# Patient Record
Sex: FEMALE | Race: WHITE | NOT HISPANIC OR LATINO | Employment: OTHER | URBAN - METROPOLITAN AREA
[De-identification: names, ages, dates, MRNs, and addresses within clinical notes are randomized per-mention and may not be internally consistent; named-entity substitution may affect disease eponyms.]

---

## 2023-03-17 PROBLEM — J20.9 ACUTE BRONCHITIS: Status: ACTIVE | Noted: 2023-03-17

## 2023-03-17 PROBLEM — I10 HYPERTENSION: Status: ACTIVE | Noted: 2023-03-17

## 2023-03-17 PROBLEM — T78.40XA ACUTE ALLERGIC REACTION: Status: ACTIVE | Noted: 2023-03-17

## 2023-03-17 PROBLEM — L03.319 CELLULITIS OF TRUNK: Status: ACTIVE | Noted: 2023-03-17

## 2023-03-17 PROBLEM — R93.1 ELEVATED CORONARY ARTERY CALCIUM SCORE: Status: ACTIVE | Noted: 2023-03-17

## 2023-03-17 PROBLEM — J30.9 ALLERGIC RHINITIS: Status: ACTIVE | Noted: 2023-03-17

## 2023-03-17 PROBLEM — L30.9 ECZEMA: Status: ACTIVE | Noted: 2023-03-17

## 2023-03-17 PROBLEM — J34.2 NASAL SEPTAL DEVIATION: Status: ACTIVE | Noted: 2023-03-17

## 2023-03-17 PROBLEM — T70.20XA: Status: ACTIVE | Noted: 2023-03-17

## 2023-03-17 PROBLEM — J32.0 CHRONIC MAXILLARY SINUSITIS: Status: ACTIVE | Noted: 2023-03-17

## 2023-03-17 PROBLEM — E78.5 HYPERLIPIDEMIA: Status: ACTIVE | Noted: 2023-03-17

## 2023-03-17 RX ORDER — ACETAMINOPHEN 160 MG/5ML
SUSPENSION, ORAL (FINAL DOSE FORM) ORAL
COMMUNITY
Start: 2021-06-07 | End: 2023-03-28

## 2023-03-17 RX ORDER — ACETAZOLAMIDE 125 MG/1
1 TABLET ORAL 2 TIMES DAILY
COMMUNITY
Start: 2022-03-22 | End: 2023-03-28

## 2023-03-17 RX ORDER — FLUTICASONE PROPIONATE AND SALMETEROL 100; 50 UG/1; UG/1
1 POWDER RESPIRATORY (INHALATION) 2 TIMES DAILY
COMMUNITY
Start: 2022-03-22 | End: 2023-03-28

## 2023-03-17 RX ORDER — VIT C/E/ZN/COPPR/LUTEIN/ZEAXAN 250MG-90MG
3 CAPSULE ORAL DAILY
COMMUNITY
End: 2024-04-12

## 2023-03-17 RX ORDER — PROPRANOLOL HYDROCHLORIDE 10 MG/1
TABLET ORAL 2 TIMES DAILY
COMMUNITY
Start: 2019-07-25 | End: 2023-03-28 | Stop reason: SDUPTHER

## 2023-03-28 ENCOUNTER — OFFICE VISIT (OUTPATIENT)
Dept: PRIMARY CARE | Facility: CLINIC | Age: 68
End: 2023-03-28
Payer: MEDICARE

## 2023-03-28 ENCOUNTER — LAB (OUTPATIENT)
Dept: LAB | Facility: LAB | Age: 68
End: 2023-03-28
Payer: MEDICARE

## 2023-03-28 VITALS
SYSTOLIC BLOOD PRESSURE: 122 MMHG | HEART RATE: 63 BPM | HEIGHT: 64 IN | DIASTOLIC BLOOD PRESSURE: 74 MMHG | OXYGEN SATURATION: 99 % | WEIGHT: 136 LBS | BODY MASS INDEX: 23.22 KG/M2

## 2023-03-28 DIAGNOSIS — I10 PRIMARY HYPERTENSION: ICD-10-CM

## 2023-03-28 DIAGNOSIS — Z00.00 ANNUAL PHYSICAL EXAM: Primary | ICD-10-CM

## 2023-03-28 DIAGNOSIS — E78.5 HYPERLIPIDEMIA, UNSPECIFIED HYPERLIPIDEMIA TYPE: ICD-10-CM

## 2023-03-28 DIAGNOSIS — Z00.00 ANNUAL PHYSICAL EXAM: ICD-10-CM

## 2023-03-28 LAB
ALANINE AMINOTRANSFERASE (SGPT) (U/L) IN SER/PLAS: 16 U/L (ref 7–45)
ALBUMIN (G/DL) IN SER/PLAS: 4.4 G/DL (ref 3.4–5)
ALKALINE PHOSPHATASE (U/L) IN SER/PLAS: 71 U/L (ref 33–136)
ANION GAP IN SER/PLAS: 13 MMOL/L (ref 10–20)
ASPARTATE AMINOTRANSFERASE (SGOT) (U/L) IN SER/PLAS: 20 U/L (ref 9–39)
BILIRUBIN TOTAL (MG/DL) IN SER/PLAS: 0.4 MG/DL (ref 0–1.2)
CALCIUM (MG/DL) IN SER/PLAS: 9.6 MG/DL (ref 8.6–10.6)
CARBON DIOXIDE, TOTAL (MMOL/L) IN SER/PLAS: 28 MMOL/L (ref 21–32)
CHLORIDE (MMOL/L) IN SER/PLAS: 101 MMOL/L (ref 98–107)
CHOLESTEROL (MG/DL) IN SER/PLAS: 319 MG/DL (ref 0–199)
CHOLESTEROL IN HDL (MG/DL) IN SER/PLAS: 58.2 MG/DL
CHOLESTEROL/HDL RATIO: 5.5
CREATININE (MG/DL) IN SER/PLAS: 0.84 MG/DL (ref 0.5–1.05)
ERYTHROCYTE DISTRIBUTION WIDTH (RATIO) BY AUTOMATED COUNT: 13.5 % (ref 11.5–14.5)
ERYTHROCYTE MEAN CORPUSCULAR HEMOGLOBIN CONCENTRATION (G/DL) BY AUTOMATED: 30.8 G/DL (ref 32–36)
ERYTHROCYTE MEAN CORPUSCULAR VOLUME (FL) BY AUTOMATED COUNT: 92 FL (ref 80–100)
ERYTHROCYTES (10*6/UL) IN BLOOD BY AUTOMATED COUNT: 4.35 X10E12/L (ref 4–5.2)
GFR FEMALE: 75 ML/MIN/1.73M2
GLUCOSE (MG/DL) IN SER/PLAS: 109 MG/DL (ref 74–99)
HEMATOCRIT (%) IN BLOOD BY AUTOMATED COUNT: 40 % (ref 36–46)
HEMOGLOBIN (G/DL) IN BLOOD: 12.3 G/DL (ref 12–16)
LDL: 237 MG/DL (ref 0–99)
LEUKOCYTES (10*3/UL) IN BLOOD BY AUTOMATED COUNT: 4.7 X10E9/L (ref 4.4–11.3)
NRBC (PER 100 WBCS) BY AUTOMATED COUNT: 0 /100 WBC (ref 0–0)
PLATELETS (10*3/UL) IN BLOOD AUTOMATED COUNT: 268 X10E9/L (ref 150–450)
POTASSIUM (MMOL/L) IN SER/PLAS: 4.3 MMOL/L (ref 3.5–5.3)
PROTEIN TOTAL: 6.7 G/DL (ref 6.4–8.2)
SODIUM (MMOL/L) IN SER/PLAS: 138 MMOL/L (ref 136–145)
TRIGLYCERIDE (MG/DL) IN SER/PLAS: 121 MG/DL (ref 0–149)
UREA NITROGEN (MG/DL) IN SER/PLAS: 12 MG/DL (ref 6–23)
VLDL: 24 MG/DL (ref 0–40)

## 2023-03-28 PROCEDURE — 3078F DIAST BP <80 MM HG: CPT | Performed by: FAMILY MEDICINE

## 2023-03-28 PROCEDURE — 85027 COMPLETE CBC AUTOMATED: CPT

## 2023-03-28 PROCEDURE — 1160F RVW MEDS BY RX/DR IN RCRD: CPT | Performed by: FAMILY MEDICINE

## 2023-03-28 PROCEDURE — G0439 PPPS, SUBSEQ VISIT: HCPCS | Performed by: FAMILY MEDICINE

## 2023-03-28 PROCEDURE — 80061 LIPID PANEL: CPT

## 2023-03-28 PROCEDURE — 1036F TOBACCO NON-USER: CPT | Performed by: FAMILY MEDICINE

## 2023-03-28 PROCEDURE — 36415 COLL VENOUS BLD VENIPUNCTURE: CPT

## 2023-03-28 PROCEDURE — 3074F SYST BP LT 130 MM HG: CPT | Performed by: FAMILY MEDICINE

## 2023-03-28 PROCEDURE — 1170F FXNL STATUS ASSESSED: CPT | Performed by: FAMILY MEDICINE

## 2023-03-28 PROCEDURE — 80053 COMPREHEN METABOLIC PANEL: CPT

## 2023-03-28 PROCEDURE — 1159F MED LIST DOCD IN RCRD: CPT | Performed by: FAMILY MEDICINE

## 2023-03-28 RX ORDER — PROPRANOLOL HYDROCHLORIDE 10 MG/1
30 TABLET ORAL DAILY
Qty: 270 TABLET | Refills: 3 | Status: SHIPPED | OUTPATIENT
Start: 2023-03-28 | End: 2024-03-27 | Stop reason: SDUPTHER

## 2023-03-28 ASSESSMENT — PAIN SCALES - GENERAL: PAINLEVEL: 0-NO PAIN

## 2023-03-28 ASSESSMENT — ENCOUNTER SYMPTOMS
OCCASIONAL FEELINGS OF UNSTEADINESS: 0
LOSS OF SENSATION IN FEET: 0
DEPRESSION: 0

## 2023-03-28 ASSESSMENT — ACTIVITIES OF DAILY LIVING (ADL)
DRESSING: INDEPENDENT
GROCERY_SHOPPING: INDEPENDENT
DOING_HOUSEWORK: INDEPENDENT
TAKING_MEDICATION: INDEPENDENT
MANAGING_FINANCES: INDEPENDENT
BATHING: INDEPENDENT

## 2023-03-28 NOTE — PROGRESS NOTES
"Subjective   Patient ID: Jaqueline Pastrana is a 67 y.o. female who presents for Medicare Annual Wellness Visit Subsequent (Medicare annual physical exam. Pt is fasting. ).    HPI   Just got over food poisoning. Otherwise doing well.    Review of Systems  Cardiovascular: no chest pain, no edema, no palpitations, and no syncope.   Respiratory: no cough,no shortness of breath, and no wheezing  Gastrointestinal: no abdominal pain, nausea, vomiting or blood in stools  Genitourinary: no dysuria or hematuria  Objective   /74 (BP Location: Left arm, Patient Position: Sitting, BP Cuff Size: Adult)   Pulse 63   Ht 1.626 m (5' 4\")   Wt 61.7 kg (136 lb)   SpO2 99%   BMI 23.34 kg/m²     Physical Exam  Alert, pleasant and in no acute distress.  Heart: Regular rate and rhythm without murmur  Lungs: Clear to auscultation  Abdomen: Soft, nontender and without mass  Lower extremities: No edema  Assessment/Plan   Problem List Items Addressed This Visit          Circulatory    Hypertension    Relevant Medications    propranolol (Inderal) 10 mg tablet    Other Relevant Orders    Comprehensive Metabolic Panel (Completed)    CBC (Completed)    Lipid Panel (Completed)       Other    Hyperlipidemia    Relevant Orders    Comprehensive Metabolic Panel (Completed)    CBC (Completed)    Lipid Panel (Completed)     Other Visit Diagnoses       Annual physical exam    -  Primary    Relevant Orders    Comprehensive Metabolic Panel (Completed)    CBC (Completed)    Lipid Panel (Completed)        Patient here for annual checkup.  She feels she is doing better than she has in a long time.  Her allergies and eczema are under very good control.  Her diet which was very healthy she has worked on improving even further.  Routine labs obtained.  We will call in 1 week with results.  Colonoscopy and mammogram up-to-date  Blood pressure under good control.  We will continue propranolol  Follow up in 6 mos       "

## 2023-04-18 DIAGNOSIS — R10.9 ABDOMINAL CRAMPS: Primary | ICD-10-CM

## 2023-04-18 RX ORDER — DICYCLOMINE HYDROCHLORIDE 10 MG/1
10 CAPSULE ORAL 4 TIMES DAILY PRN
Qty: 45 CAPSULE | Refills: 2 | Status: SHIPPED | OUTPATIENT
Start: 2023-04-18 | End: 2023-07-17

## 2024-03-26 DIAGNOSIS — I10 PRIMARY HYPERTENSION: ICD-10-CM

## 2024-03-26 NOTE — TELEPHONE ENCOUNTER
Pt was in last march & needed refill on propranolol 10 mg 3 daily to [facility]  [phone].  Made apt in may for annual  Allergies to amlodipine,atorvastatin,codeine,lactose,pseudoephedrine,rosuvastatin,statins  Scallops,sulfa,wheat  Ty

## 2024-03-27 ENCOUNTER — NURSE TRIAGE (OUTPATIENT)
Dept: PRIMARY CARE | Facility: CLINIC | Age: 69
End: 2024-03-27
Payer: MEDICARE

## 2024-03-27 RX ORDER — PROPRANOLOL HYDROCHLORIDE 10 MG/1
30 TABLET ORAL DAILY
Qty: 270 TABLET | Refills: 3 | Status: SHIPPED | OUTPATIENT
Start: 2024-03-27 | End: 2025-03-27

## 2024-04-12 ENCOUNTER — HOSPITAL ENCOUNTER (OUTPATIENT)
Dept: RADIOLOGY | Facility: CLINIC | Age: 69
Discharge: HOME | End: 2024-04-12
Payer: MEDICARE

## 2024-04-12 ENCOUNTER — OFFICE VISIT (OUTPATIENT)
Dept: PRIMARY CARE | Facility: CLINIC | Age: 69
End: 2024-04-12
Payer: MEDICARE

## 2024-04-12 VITALS — HEIGHT: 63 IN | BODY MASS INDEX: 24.53 KG/M2 | WEIGHT: 138.45 LBS

## 2024-04-12 VITALS
DIASTOLIC BLOOD PRESSURE: 74 MMHG | BODY MASS INDEX: 24.52 KG/M2 | OXYGEN SATURATION: 97 % | HEIGHT: 63 IN | SYSTOLIC BLOOD PRESSURE: 128 MMHG | TEMPERATURE: 97.5 F | WEIGHT: 138.4 LBS | HEART RATE: 82 BPM

## 2024-04-12 DIAGNOSIS — Z11.59 ENCOUNTER FOR HEPATITIS C SCREENING TEST FOR LOW RISK PATIENT: ICD-10-CM

## 2024-04-12 DIAGNOSIS — Z12.31 BREAST CANCER SCREENING BY MAMMOGRAM: ICD-10-CM

## 2024-04-12 DIAGNOSIS — G47.00 INSOMNIA, UNSPECIFIED TYPE: ICD-10-CM

## 2024-04-12 DIAGNOSIS — E78.5 HYPERLIPIDEMIA, UNSPECIFIED HYPERLIPIDEMIA TYPE: ICD-10-CM

## 2024-04-12 DIAGNOSIS — Z00.00 ANNUAL PHYSICAL EXAM: Primary | ICD-10-CM

## 2024-04-12 DIAGNOSIS — Z12.11 COLON CANCER SCREENING: ICD-10-CM

## 2024-04-12 DIAGNOSIS — I10 PRIMARY HYPERTENSION: ICD-10-CM

## 2024-04-12 LAB
ALBUMIN SERPL BCP-MCNC: 4.4 G/DL (ref 3.4–5)
ALP SERPL-CCNC: 68 U/L (ref 33–136)
ALT SERPL W P-5'-P-CCNC: 31 U/L (ref 7–45)
ANION GAP SERPL CALC-SCNC: 12 MMOL/L (ref 10–20)
AST SERPL W P-5'-P-CCNC: 34 U/L (ref 9–39)
BILIRUB SERPL-MCNC: 0.4 MG/DL (ref 0–1.2)
BUN SERPL-MCNC: 13 MG/DL (ref 6–23)
CALCIUM SERPL-MCNC: 9.8 MG/DL (ref 8.6–10.6)
CHLORIDE SERPL-SCNC: 101 MMOL/L (ref 98–107)
CHOLEST SERPL-MCNC: 291 MG/DL (ref 0–199)
CHOLESTEROL/HDL RATIO: 5
CO2 SERPL-SCNC: 29 MMOL/L (ref 21–32)
CREAT SERPL-MCNC: 0.8 MG/DL (ref 0.5–1.05)
EGFRCR SERPLBLD CKD-EPI 2021: 79 ML/MIN/1.73M*2
ERYTHROCYTE [DISTWIDTH] IN BLOOD BY AUTOMATED COUNT: 15.5 % (ref 11.5–14.5)
GLUCOSE SERPL-MCNC: 98 MG/DL (ref 74–99)
HCT VFR BLD AUTO: 45.1 % (ref 36–46)
HCV AB SER QL: NONREACTIVE
HDLC SERPL-MCNC: 58.3 MG/DL
HGB BLD-MCNC: 14.1 G/DL (ref 12–16)
LDLC SERPL CALC-MCNC: 197 MG/DL
MCH RBC QN AUTO: 29 PG (ref 26–34)
MCHC RBC AUTO-ENTMCNC: 31.3 G/DL (ref 32–36)
MCV RBC AUTO: 93 FL (ref 80–100)
NON HDL CHOLESTEROL: 233 MG/DL (ref 0–149)
NRBC BLD-RTO: 0 /100 WBCS (ref 0–0)
PLATELET # BLD AUTO: 265 X10*3/UL (ref 150–450)
POTASSIUM SERPL-SCNC: 4.8 MMOL/L (ref 3.5–5.3)
PROT SERPL-MCNC: 7.2 G/DL (ref 6.4–8.2)
RBC # BLD AUTO: 4.86 X10*6/UL (ref 4–5.2)
SODIUM SERPL-SCNC: 137 MMOL/L (ref 136–145)
TRIGL SERPL-MCNC: 178 MG/DL (ref 0–149)
VLDL: 36 MG/DL (ref 0–40)
WBC # BLD AUTO: 4.8 X10*3/UL (ref 4.4–11.3)

## 2024-04-12 PROCEDURE — 1036F TOBACCO NON-USER: CPT | Performed by: FAMILY MEDICINE

## 2024-04-12 PROCEDURE — 80053 COMPREHEN METABOLIC PANEL: CPT

## 2024-04-12 PROCEDURE — 3074F SYST BP LT 130 MM HG: CPT | Performed by: FAMILY MEDICINE

## 2024-04-12 PROCEDURE — G0439 PPPS, SUBSEQ VISIT: HCPCS | Performed by: FAMILY MEDICINE

## 2024-04-12 PROCEDURE — 1157F ADVNC CARE PLAN IN RCRD: CPT | Performed by: FAMILY MEDICINE

## 2024-04-12 PROCEDURE — 77067 SCR MAMMO BI INCL CAD: CPT

## 2024-04-12 PROCEDURE — 80061 LIPID PANEL: CPT

## 2024-04-12 PROCEDURE — 1126F AMNT PAIN NOTED NONE PRSNT: CPT | Performed by: FAMILY MEDICINE

## 2024-04-12 PROCEDURE — 77063 BREAST TOMOSYNTHESIS BI: CPT | Performed by: STUDENT IN AN ORGANIZED HEALTH CARE EDUCATION/TRAINING PROGRAM

## 2024-04-12 PROCEDURE — 99213 OFFICE O/P EST LOW 20 MIN: CPT | Performed by: FAMILY MEDICINE

## 2024-04-12 PROCEDURE — 1160F RVW MEDS BY RX/DR IN RCRD: CPT | Performed by: FAMILY MEDICINE

## 2024-04-12 PROCEDURE — 1159F MED LIST DOCD IN RCRD: CPT | Performed by: FAMILY MEDICINE

## 2024-04-12 PROCEDURE — 3078F DIAST BP <80 MM HG: CPT | Performed by: FAMILY MEDICINE

## 2024-04-12 PROCEDURE — 1170F FXNL STATUS ASSESSED: CPT | Performed by: FAMILY MEDICINE

## 2024-04-12 PROCEDURE — 77067 SCR MAMMO BI INCL CAD: CPT | Performed by: STUDENT IN AN ORGANIZED HEALTH CARE EDUCATION/TRAINING PROGRAM

## 2024-04-12 PROCEDURE — 36415 COLL VENOUS BLD VENIPUNCTURE: CPT

## 2024-04-12 PROCEDURE — 85027 COMPLETE CBC AUTOMATED: CPT

## 2024-04-12 PROCEDURE — 86803 HEPATITIS C AB TEST: CPT

## 2024-04-12 RX ORDER — ZOLPIDEM TARTRATE 5 MG/1
2.5-5 TABLET ORAL NIGHTLY PRN
Qty: 30 TABLET | Refills: 0 | Status: SHIPPED | OUTPATIENT
Start: 2024-04-12 | End: 2024-06-11

## 2024-04-12 ASSESSMENT — ENCOUNTER SYMPTOMS
DEPRESSION: 0
LOSS OF SENSATION IN FEET: 0
OCCASIONAL FEELINGS OF UNSTEADINESS: 0

## 2024-04-12 ASSESSMENT — PAIN SCALES - GENERAL: PAINLEVEL: 0-NO PAIN

## 2024-04-12 ASSESSMENT — PATIENT HEALTH QUESTIONNAIRE - PHQ9
2. FEELING DOWN, DEPRESSED OR HOPELESS: NOT AT ALL
1. LITTLE INTEREST OR PLEASURE IN DOING THINGS: NOT AT ALL
SUM OF ALL RESPONSES TO PHQ9 QUESTIONS 1 AND 2: 0

## 2024-04-12 ASSESSMENT — ACTIVITIES OF DAILY LIVING (ADL)
BATHING: INDEPENDENT
MANAGING_FINANCES: INDEPENDENT
GROCERY_SHOPPING: INDEPENDENT
TAKING_MEDICATION: INDEPENDENT
DRESSING: INDEPENDENT
DOING_HOUSEWORK: INDEPENDENT

## 2024-04-12 NOTE — PROGRESS NOTES
"Subjective   Patient ID: Gabriela Pastrana is a 68 y.o. female who presents for Medicare Annual Wellness Visit Subsequent (Medicare annual wellness exam.) and Annual Exam (Annual physical exam, pt is fasting.).    HPI   Patient here for Medicare wellness  She is doing well.  She exercises regularly and eats a very healthy clean vegetarian-based diet.  Her chronic eczema has improved.  Main complaint today is insomnia.  Patient states that once or twice a week she just cannot fall asleep.  She has tried various meditative type activities as well as reading and still struggles.  Review of Systems    Objective   /74 (BP Location: Left arm, Patient Position: Sitting, BP Cuff Size: Adult)   Pulse 82   Temp 36.4 °C (97.5 °F) (Temporal)   Ht 1.604 m (5' 3.15\")   Wt 62.8 kg (138 lb 6.4 oz)   SpO2 97%   BMI 24.40 kg/m²     Physical Exam  Alert, pleasant and in no acute distress.  Neck: Supple, no JVD or carotid bruit  Heart: Regular rate and rhythm, no murmur  Lungs: Clear to auscultation  Lower extremities: No edema  Abdomen: Soft, nontender without palpable mass    Assessment/Plan   Problem List Items Addressed This Visit             ICD-10-CM    Hyperlipidemia E78.5    Relevant Orders    Comprehensive Metabolic Panel    CBC    Lipid Panel    Hypertension I10    Relevant Orders    Comprehensive Metabolic Panel    CBC    Lipid Panel     Other Visit Diagnoses         Codes    Annual physical exam    -  Primary Z00.00    Relevant Orders    Comprehensive Metabolic Panel    CBC    Lipid Panel    Insomnia, unspecified type     G47.00    Relevant Medications    zolpidem (Ambien) 5 mg tablet    Encounter for hepatitis C screening test for low risk patient     Z11.59    Relevant Orders    Hepatitis C antibody    Colon cancer screening     Z12.11    Relevant Orders    Colonoscopy Screening; Average Risk Patient    Breast cancer screening by mammogram     Z12.31    Relevant Orders    BI mammo bilateral screening tomosynthesis "        1.  Health maintenance: Care gaps addressed  2.  Insomnia: Long talk regarding various treatment options.  Will provide a one-time prescription of Ambien.  Safety and use discussed.  [place]ARRS reviewed.  3.  Hypertension/hyperlipidemia: Blood pressure under control with propranolol.  Patient did not tolerate statins in the past.  She continues working on healthy diet and exercise with a vegetarian diet.  Will recheck levels.  Will contact patient in 3 to 5 days with results.  Plan follow-up in 6 months

## 2024-04-15 DIAGNOSIS — Z12.11 COLON CANCER SCREENING: ICD-10-CM

## 2024-04-16 RX ORDER — POLYETHYLENE GLYCOL 3350, SODIUM SULFATE ANHYDROUS, SODIUM BICARBONATE, SODIUM CHLORIDE, POTASSIUM CHLORIDE 236; 22.74; 6.74; 5.86; 2.97 G/4L; G/4L; G/4L; G/4L; G/4L
4000 POWDER, FOR SOLUTION ORAL ONCE
Qty: 4000 ML | Refills: 0 | Status: SHIPPED | OUTPATIENT
Start: 2024-04-16 | End: 2024-04-16

## 2024-04-17 ENCOUNTER — HOSPITAL ENCOUNTER (OUTPATIENT)
Dept: RADIOLOGY | Facility: EXTERNAL LOCATION | Age: 69
Discharge: HOME | End: 2024-04-17
Payer: MEDICARE

## 2024-05-02 ENCOUNTER — APPOINTMENT (OUTPATIENT)
Dept: PRIMARY CARE | Facility: CLINIC | Age: 69
End: 2024-05-02
Payer: MEDICARE

## 2024-05-14 ENCOUNTER — OFFICE VISIT (OUTPATIENT)
Dept: GASTROENTEROLOGY | Facility: EXTERNAL LOCATION | Age: 69
End: 2024-05-14
Payer: MEDICARE

## 2024-05-14 DIAGNOSIS — K64.8 INTERNAL HEMORRHOIDS: ICD-10-CM

## 2024-05-14 DIAGNOSIS — K57.30 DIVERTICULOSIS OF LARGE INTESTINE WITHOUT DIVERTICULITIS: ICD-10-CM

## 2024-05-14 DIAGNOSIS — Z80.0 FAMILY HISTORY OF MALIGNANT NEOPLASM OF GASTROINTESTINAL TRACT: Primary | ICD-10-CM

## 2024-05-14 DIAGNOSIS — Z12.11 COLON CANCER SCREENING: ICD-10-CM

## 2024-05-14 PROCEDURE — G0105 COLORECTAL SCRN; HI RISK IND: HCPCS | Performed by: INTERNAL MEDICINE

## 2024-05-14 PROCEDURE — 1157F ADVNC CARE PLAN IN RCRD: CPT | Performed by: INTERNAL MEDICINE

## 2024-05-14 PROCEDURE — 1160F RVW MEDS BY RX/DR IN RCRD: CPT | Performed by: INTERNAL MEDICINE

## 2024-05-14 PROCEDURE — 1159F MED LIST DOCD IN RCRD: CPT | Performed by: INTERNAL MEDICINE

## 2024-07-01 DIAGNOSIS — G47.00 INSOMNIA, UNSPECIFIED TYPE: ICD-10-CM

## 2024-07-01 RX ORDER — ZOLPIDEM TARTRATE 5 MG/1
2.5-5 TABLET ORAL NIGHTLY PRN
Qty: 30 TABLET | Refills: 0 | Status: SHIPPED | OUTPATIENT
Start: 2024-07-01 | End: 2024-08-30

## 2024-07-01 RX ORDER — AMITRIPTYLINE HYDROCHLORIDE 10 MG/1
5-10 TABLET, FILM COATED ORAL NIGHTLY
Qty: 30 TABLET | Refills: 5 | Status: SHIPPED | OUTPATIENT
Start: 2024-07-01 | End: 2024-12-28

## 2024-07-01 NOTE — PROGRESS NOTES
Patient needing Ambien regularly.  We will switch to amitriptyline.  Can use Ambien as needed it is a prescription.